# Patient Record
Sex: MALE | Race: BLACK OR AFRICAN AMERICAN | Employment: UNEMPLOYED | URBAN - METROPOLITAN AREA
[De-identification: names, ages, dates, MRNs, and addresses within clinical notes are randomized per-mention and may not be internally consistent; named-entity substitution may affect disease eponyms.]

---

## 2022-12-16 ENCOUNTER — HOSPITAL ENCOUNTER (EMERGENCY)
Age: 57
Discharge: HOME | End: 2022-12-16

## 2022-12-16 VITALS
HEART RATE: 65 BPM | TEMPERATURE: 97.5 F | OXYGEN SATURATION: 100 % | DIASTOLIC BLOOD PRESSURE: 109 MMHG | RESPIRATION RATE: 18 BRPM | SYSTOLIC BLOOD PRESSURE: 175 MMHG

## 2022-12-16 DIAGNOSIS — I10 ASYMPTOMATIC HYPERTENSION: Primary | ICD-10-CM

## 2022-12-16 PROCEDURE — 6370000000 HC RX 637 (ALT 250 FOR IP): Performed by: PHYSICIAN ASSISTANT

## 2022-12-16 PROCEDURE — 99283 EMERGENCY DEPT VISIT LOW MDM: CPT

## 2022-12-16 RX ORDER — HYDROCHLOROTHIAZIDE 25 MG/1
12.5 TABLET ORAL DAILY
Status: DISCONTINUED | OUTPATIENT
Start: 2022-12-16 | End: 2022-12-16 | Stop reason: HOSPADM

## 2022-12-16 RX ORDER — HYDROCHLOROTHIAZIDE 25 MG/1
25 TABLET ORAL EVERY MORNING
Qty: 30 TABLET | Refills: 1 | Status: SHIPPED | OUTPATIENT
Start: 2022-12-16

## 2022-12-16 RX ADMIN — HYDROCHLOROTHIAZIDE 12.5 MG: 25 TABLET ORAL at 13:00

## 2022-12-16 NOTE — ED NOTES
Pt to ED with hypertension. Pt states he was at an occupational health appointment to start at a new job on Monday, but the provider sent him to the ED to be prescribed blood pressure medications due to the multiple high blood pressure readings.       Precious Meade  12/16/22 5946

## 2022-12-16 NOTE — CARE COORDINATION
CM consult per Daniela Haney PA-C to assist with discharge planning for [place] needing o/p resources. [place] hand out given to pt for resource to assist with community o/p follow up needs.  NATALIO,RN/CM

## 2022-12-17 NOTE — ED PROVIDER NOTES
[address] ENCOUNTER        Pt Name: Reinaldo Ny  MRN: [id number]  [place] 1965  Date of evaluation: 12/16/2022  Provider: DENNY Arce  PCP: No primary care provider on file. SOLANGE. I have evaluated this patient. My supervising physician was available for consultation. Triage CHIEF COMPLAINT       Chief Complaint   Patient presents with    Hypertension         HISTORY OF PRESENT ILLNESS      Chief Complaint: Hypertension    Reinaldo Ny is a 56 y.o. 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 Patient was at [facility] and they were doing a generalized screening and fell that he had high blood pressure readings. Patient has never been treated with blood pressure, he describes no significant medical history. Was advised to follow-up with primary care and be initiated on antihypertensive but he came to the emergency department instead because he has no other resources. He is having no chest pain no abdominal pain no flank pains. No headache. He does not have a primary care doctor since [place]. Nursing Notes were all reviewed and agreed with or any disagreements were addressed in the HPI.     REVIEW OF SYSTEMS     Constitutional:   Denies fever, chills, weight loss or weakness   HENT:   Denies sore throat or ear pain   Cardiovascular:   Denies chest pain, palpitations   Respiratory:  Denies cough or shortness of breath    GI:   Denies abdominal pain, nausea, vomiting, or diarrhea  :  Denies any urinary symptoms   Musculoskeletal:   Denies back pain  Skin:   Denies rash  Neurologic:  Denies headache, focal weakness or sensory changes   Endocrine:  Denies polyuria or polydypsia   Lymphatic:  Denies swollen glands     PAST MEDICAL HISTORY   No past medical history on file. SURGICAL HISTORY   No past surgical history on file. [address]       Discharge Medication List as of 12/16/2022  1:13 PM          ALLERGIES     Patient has no known allergies. FAMILYHISTORY     No family history on file. SOCIAL HISTORY       Social History     Socioeconomic History    Marital status:        SCREENINGS           PHYSICAL EXAM       ED Triage Vitals [12/16/22 1217]   BP Temp Temp src Heart Rate Resp SpO2 Height Weight   (!) 175/109 97.5 °F (36.4 °C) -- 65 18 100 % -- --      Constitutional:  Well developed, Well nourished. No distress  HENT:  Normocephalic, Atraumatic, PERRL. EOMI. Sclera clear. Conjunctiva normal, No discharge. Neck/Lymphatics: supple, no JVD, no swollen nodes  Cardiovascular:   RRR,  no murmurs/rubs/gallops. Respiratory:   Nonlabored breathing. Normal breath sounds, No wheezing  Abdomen: Bowel sounds normal, Soft, No tenderness, no masses. Musculoskeletal:    There is no edema, asymmetry, or calf / thigh tenderness bilaterally. No cyanosis. No cool or pale-appearing limb. Distal cap refill and pulses intact bilateral upper and lower extremities  Bilateral upper and lower extremity ROM intact without pain or obvious deficit  Integument:   Warm, Dry  Neurologic:  Alert & oriented , No focal deficits noted. Cranial nerves II through XII grossly intact. Normal gross motor coordination & motor strength bilateral upper and lower extremities  Sensation intact. Psychiatric:  Affect normal, Mood normal.     DIAGNOSTIC RESULTS   LABS:    Labs Reviewed - No data to display    When ordered, only abnormal lab results are displayed. All other labs were within normal range or not returned as of this dictation. EKG: When ordered, EKG's are interpreted by the Emergency Department Physician in the absence of a cardiologist.  Please see their note for interpretation of EKG.     RADIOLOGY:   Non-plain film images such as CT, Ultrasound and MRI are read by the radiologist. Plain radiographic images are visualized and preliminarily interpreted by the  ED Provider with the below findings:    Interpretation [facility] Radiologist below, if available at the time of this note:    No orders to display     No results found. PROCEDURES   Unless otherwise noted below, none     CRITICAL CARE   CRITICAL CARE NOTE:   N/A    CONSULTS:  IP CONSULT TO CASE MANAGEMENT      EMERGENCY DEPARTMENT COURSE and MDM:   Vitals:    Vitals:    12/16/22 1217   BP: (!) 175/109   Pulse: 65   Resp: 18   Temp: 97.5 °F (36.4 °C)   SpO2: 100%       Patient was given thefollowing medications:  Medications - No data to display      Is this patient to be included in the SEP-1 Core Measure due to severe sepsis or septic shock? No   Exclusion criteria - the patient is NOT to be included for SEP-1 Core Measure due to: Infection is not suspected    MDM:  Patient presents as above. Emergent etiologies considered. Patient seen and examined. Work-up initiated secondary to presentation, physical exam findings, vital signs and medical chart review. In brief, 53-year-old male presenting to the emergency department today with asymptomatic hypertension coming to be screened after he was seen at his employers with elevated blood pressure readings. His blood pressure was in the 170s over 100s here today. Having no active symptoms he is in no obvious distress. Did call the health screening entity that advised that he be initiated on antihypertensive. We will start him on hydrochlorothiazide. We will get  involved to get a primary care provider. He will otherwise be discharged home in stable condition. CLINICAL IMPRESSION      1.  Asymptomatic hypertension          DISPOSITION/PLAN   DISPOSITION Decision To Discharge 12/16/2022 12:56:12 PM      PATIENT REFERREDTO:  Follow up with Case management resources as provided today            DISCHARGE MEDICATIONS:  Discharge Medication List as of 12/16/2022  1:13 PM        START taking these medications    Details   hydroCHLOROthiazide (HYDRODIURIL) 25 MG tablet Take 1 tablet by mouth every morning, Disp-30 tablet, R-1Print             DISCONTINUED MEDICATIONS:  Discharge Medication List as of 12/16/2022  1:13 PM                 (Please note that portions ofthis note were completed with a voice recognition program.  Efforts were made to edit the dictations but occasionally words are mis-transcribed. )    DENNY Antony (electronically signed)            DENNY Garcia  12/16/22 1932

## 2023-01-04 ENCOUNTER — HOSPITAL ENCOUNTER (EMERGENCY)
Age: 58
Discharge: HOME | End: 2023-01-04

## 2023-01-04 VITALS
RESPIRATION RATE: 16 BRPM | SYSTOLIC BLOOD PRESSURE: 138 MMHG | HEART RATE: 92 BPM | TEMPERATURE: 97.7 F | OXYGEN SATURATION: 100 % | DIASTOLIC BLOOD PRESSURE: 98 MMHG

## 2023-01-04 DIAGNOSIS — I10 ASYMPTOMATIC HYPERTENSION: Primary | ICD-10-CM

## 2023-01-04 PROCEDURE — 99282 EMERGENCY DEPT VISIT SF MDM: CPT

## 2023-01-04 ASSESSMENT — ENCOUNTER SYMPTOMS
SHORTNESS OF BREATH: 0
ABDOMINAL PAIN: 0
CHEST TIGHTNESS: 0
COUGH: 0
COLOR CHANGE: 0

## 2023-01-04 NOTE — ED PROVIDER NOTES
[address] ENCOUNTER        Pt Name: Josué Adan  MRN: [id number]  [place] 1965  Date of evaluation: 1/4/2023  Provider: LENA Diaz CNP  PCP: No primary care provider on file. Note Started: 10:56 AM EST 1/4/23      SOLANGE. I have evaluated this patient. My supervising physician was available for consultation. CHIEF COMPLAINT       Chief Complaint   Patient presents with    Other     States needs medical clearance for work today. HISTORY OF PRESENT ILLNESS: 1 Element     History From: Patient    Josué Adan is a 56 y.o. male who presents requesting   Pt is [place] and communication is done per professional  service. Pt is here requesting medical clearance to return to work due to hypertension. He is starting a new job at [employer], however his blood pressure is elevated at 140/102 and was 168/116 at work today per the patient. Pt denies headache, blurred vision, chest pain. Nursing Notes were all reviewed and agreed with or any disagreements were addressed in the HPI. REVIEW OF SYSTEMS :      Review of Systems   Constitutional:  Negative for chills, fatigue and fever. HENT:  Negative for congestion. Respiratory:  Negative for cough, chest tightness and shortness of breath. Cardiovascular:  Negative for chest pain and leg swelling. Gastrointestinal:  Negative for abdominal pain. Genitourinary:  Negative for difficulty urinating and dysuria. Musculoskeletal:  Negative for myalgias. Skin:  Negative for color change and rash. Neurological:  Negative for dizziness, weakness and headaches. Psychiatric/Behavioral:  Negative for confusion. Positives and Pertinent negatives as per HPI. SURGICAL HISTORY   No past surgical history on file.     [address]       Discharge Medication List as of 1/4/2023 11:36 AM        CONTINUE these medications which have NOT CHANGED    Details   hydroCHLOROthiazide (HYDRODIURIL) 25 MG tablet Take 1 tablet by mouth every morning, Disp-30 tablet, R-1Print             ALLERGIES     Patient has no known allergies. FAMILYHISTORY     No family history on file. SOCIAL HISTORY          SCREENINGS    [place] Coma Scale  Eye Opening: Spontaneous  Best Verbal Response: Oriented  Best Motor Response: Obeys commands  [place] Coma Scale Score: 15      PHYSICAL EXAM       ED Triage Vitals [01/04/23 1049]   BP Temp Temp src Heart Rate Resp SpO2 Height Weight   (!) 140/102 97.7 °F (36.5 °C) -- 88 16 100 % -- --       Physical Exam  Vitals and nursing note reviewed. Constitutional:       General: He is not in acute distress. Appearance: He is not ill-appearing or toxic-appearing. HENT:      Head: Normocephalic and atraumatic. Right Ear: External ear normal.      Left Ear: External ear normal.      Mouth/Throat:      Mouth: Mucous membranes are moist.      Pharynx: No oropharyngeal exudate or posterior oropharyngeal erythema. Eyes:      General: No scleral icterus. Cardiovascular:      Rate and Rhythm: Normal rate and regular rhythm. Pulses: Normal pulses. Heart sounds: Normal heart sounds. Pulmonary:      Effort: Pulmonary effort is normal. No respiratory distress. Breath sounds: Normal breath sounds. Abdominal:      General: There is no distension. Palpations: Abdomen is soft. Tenderness: There is no abdominal tenderness. Musculoskeletal:         General: Normal range of motion. Cervical back: Normal range of motion and neck supple. No rigidity or tenderness. Skin:     General: Skin is warm and dry. Capillary Refill: Capillary refill takes less than 2 seconds. Neurological:      General: No focal deficit present. Mental Status: He is alert and oriented to person, place, and time.    Psychiatric:         Mood and Affect: Mood normal.       DIAGNOSTIC RESULTS   LABS:    Labs Reviewed - No data to display    When ordered only abnormal lab results are displayed. All other labs were within normal range or not returned as of this dictation. EKG: When ordered, EKG's are interpreted by the Emergency Department Physician in the absence of a cardiologist.  Please see their note for interpretation of EKG. RADIOLOGY:   Non-plain film images such as CT, Ultrasound and MRI are read by the radiologist. Plain radiographic images are visualized and preliminarily interpreted by the ED Provider with the below findings:    Interpretation per the Radiologist below, if available at the time of this note:    No orders to display     No results found. PROCEDURES   Unless otherwise noted below, none     Procedures    CRITICAL CARE TIME     CONSULTS: (Who and What was discussed)  None     PAST MEDICAL HISTORY   No past medical history on file. has no past medical history on file. EMERGENCY DEPARTMENT COURSE and DIFFERENTIAL DIAGNOSIS/MDM:   Vitals:    Vitals:    01/04/23 1049 01/04/23 1106 01/04/23 1117   BP: (!) 140/102 (!) 146/101 (!) 138/98   Pulse: 88  92   Resp: 16     Temp: 97.7 °F (36.5 °C)     SpO2: 100%         Patient was given the following medications:  Medications - No data to display    Is this patient to be included in the SEP-1 Core Measure due to severe sepsis or septic shock? No   Exclusion criteria - the patient is NOT to be included for SEP-1 Core Measure due to: Infection is not suspected    This is an alert and oriented x 4 56 yom who presents requesting medical clearance. Review of previous medical records real he was evaluated for hypertension for his job  approximately 1 mth ago. Pt is [place]. He does not speak fluent English does understand and is able to communicate with some English. Attempts at communication is done per professional  service, however the service drops 6 times and we are unable to finish the visit with the .   Pt is here requesting medical clearance to return to work due to hypertension. He works at [employer], but wearing a cold check today his blood pressure is elevated at 140/102 and was 168/116 at work today per the patient. Pt denies headache, blurred vision, chest pain. Has no focal neurodeficits. He is asymptomatic. The patient sat calmly for about 10 minutes his blood pressure was rechecked and it was 138/98. I did discuss in detail with the risk associated with uncontrolled hypertension. I gave him resources for [facility]. Patient is discharged in good condition with strict ED return guidelines. CONSULTS: (Who and What was discussed)  None    Social Determinants affecting Dx or Tx:   Social Determinants : None and [place]/No insurance/    Chronic Conditions: none/does not see physician. Records Reviewed: none    I am the Primary Clinician of Record. Attending physician was available for consultation  FINAL IMPRESSION      1. Asymptomatic hypertension          DISPOSITION/PLAN     Discussed Risks and Benefits uncontrolled hypertension    DISPOSITION Decision To Discharge 01/04/2023 11:24:23 AM  PATIENT REFERRED TO:  No follow-up provider specified.     DISCHARGE MEDICATIONS:  Discharge Medication List as of 1/4/2023 11:36 AM        DISCONTINUED MEDICATIONS:  Discharge Medication List as of 1/4/2023 11:36 AM          (Please note that portions of this note were completed with a voice recognition program.  Efforts were made to edit the dictations but occasionally words are mis-transcribed.)    LENA Stern CNP (electronically signed)           LENA Stern CNP  01/04/23 [id number]

## 2023-01-04 NOTE — Clinical Note
Maurice Elizondo was seen and treated in our emergency department on 1/4/2023. He may return to work on 01/04/2023. Maurice Elizondo was seen at the Emergency Department today. His Blood pressure was 138/98. He may return to work     If you have any questions or concerns, please don't hesitate to call.       Liza Bowie, APRN - CNP